# Patient Record
Sex: FEMALE | Race: BLACK OR AFRICAN AMERICAN | URBAN - METROPOLITAN AREA
[De-identification: names, ages, dates, MRNs, and addresses within clinical notes are randomized per-mention and may not be internally consistent; named-entity substitution may affect disease eponyms.]

---

## 2020-08-27 ENCOUNTER — WEB ENCOUNTER (OUTPATIENT)
Dept: URBAN - METROPOLITAN AREA CLINIC 96 | Facility: CLINIC | Age: 40
End: 2020-08-27

## 2020-09-09 ENCOUNTER — OFFICE VISIT (OUTPATIENT)
Dept: URBAN - METROPOLITAN AREA CLINIC 96 | Facility: CLINIC | Age: 40
End: 2020-09-09

## 2022-03-14 ENCOUNTER — OFFICE VISIT (OUTPATIENT)
Dept: URBAN - METROPOLITAN AREA CLINIC 98 | Facility: CLINIC | Age: 42
End: 2022-03-14
Payer: COMMERCIAL

## 2022-03-14 VITALS
HEART RATE: 73 BPM | DIASTOLIC BLOOD PRESSURE: 66 MMHG | WEIGHT: 156.4 LBS | TEMPERATURE: 97.2 F | HEIGHT: 64 IN | SYSTOLIC BLOOD PRESSURE: 110 MMHG | BODY MASS INDEX: 26.7 KG/M2

## 2022-03-14 DIAGNOSIS — K92.1 HEMATOCHEZIA: ICD-10-CM

## 2022-03-14 DIAGNOSIS — K64.8 BLEEDING INTERNAL HEMORRHOIDS: ICD-10-CM

## 2022-03-14 PROCEDURE — 99254 IP/OBS CNSLTJ NEW/EST MOD 60: CPT | Performed by: INTERNAL MEDICINE

## 2022-03-14 PROCEDURE — 99204 OFFICE O/P NEW MOD 45 MIN: CPT | Performed by: INTERNAL MEDICINE

## 2022-03-14 RX ORDER — POLYETHYLENE GLYCOL 3350, SODIUM SULFATE, SODIUM CHLORIDE, POTASSIUM CHLORIDE, ASCORBIC ACID, SODIUM ASCORBATE 140-9-5.2G
AS DIRECTED KIT ORAL ONCE
Qty: 140 GM | Refills: 0 | OUTPATIENT
Start: 2022-03-14 | End: 2022-03-15

## 2022-03-14 NOTE — EXAM-FUNCTIONAL ASSESSMENT
Constitutional: well-developed, normal communication ability.   Eyes: Conjunctivae and eyelids appear normal, no scleral icterus. Respiratory: symmetric expansion of chest wall, normal work of breathing   Gastrointestinal:  normoactive bowel sounds, soft, no tenderness, no rebound tenderness, no shifting dullness, no organomegaly. Patient deferred rectal exam today   Musculoskeletal: normal gait and station   Skin: no jaundice   Neurologic: Oriented to person, place, time. Short term memory intact.    Psychiatric: Normal mood and appropriate affect.

## 2022-03-14 NOTE — HPI-TODAY'S VISIT:
Ms. Alejandre is a 40 yo F presenting for blood in stools and is referred by Dr. Davie Major. A copy of this report will be sent to Dr. Major.  About 1 month ago she began to have bloody stools for 4 days. This happened from hemorrhoids a few years ago when she was hemorrhoids. She has been having regular BMs now with no straining. She had some lower abdominal cramping with the bleeding. No diarrhea or fever. She has had no recent travel or change in diet. No blood in her stools since last month.   She does not take blood thinners or NSAIDs.   No known family history of colon cancer or intestinal issues.

## 2022-03-15 ENCOUNTER — WEB ENCOUNTER (OUTPATIENT)
Dept: URBAN - METROPOLITAN AREA CLINIC 98 | Facility: CLINIC | Age: 42
End: 2022-03-15

## 2022-03-15 LAB
HEMATOCRIT: 40.6
HEMOGLOBIN: 13.5
MCH: 30.1
MCHC: 33.3
MCV: 91
NRBC: (no result)
PLATELETS: 267
RBC: 4.48
RDW: 12.3
WBC: 4.1

## 2022-03-16 ENCOUNTER — TELEPHONE ENCOUNTER (OUTPATIENT)
Dept: URBAN - METROPOLITAN AREA CLINIC 98 | Facility: CLINIC | Age: 42
End: 2022-03-16

## 2022-03-25 ENCOUNTER — OFFICE VISIT (OUTPATIENT)
Dept: URBAN - METROPOLITAN AREA SURGERY CENTER 18 | Facility: SURGERY CENTER | Age: 42
End: 2022-03-25

## 2022-03-30 ENCOUNTER — OFFICE VISIT (OUTPATIENT)
Dept: URBAN - METROPOLITAN AREA MEDICAL CENTER 28 | Facility: MEDICAL CENTER | Age: 42
End: 2022-03-30
Payer: COMMERCIAL

## 2022-03-30 DIAGNOSIS — Z80.0 BROTHER AT YOUNG AGE FAMILY HISTORY OF COLON CANCER: ICD-10-CM

## 2022-03-30 PROCEDURE — G0105 COLORECTAL SCRN; HI RISK IND: HCPCS | Performed by: INTERNAL MEDICINE

## 2022-09-29 ENCOUNTER — OFFICE VISIT (OUTPATIENT)
Dept: URBAN - METROPOLITAN AREA CLINIC 98 | Facility: CLINIC | Age: 42
End: 2022-09-29
Payer: COMMERCIAL

## 2022-09-29 VITALS
HEIGHT: 64 IN | WEIGHT: 171 LBS | BODY MASS INDEX: 29.19 KG/M2 | TEMPERATURE: 97.8 F | DIASTOLIC BLOOD PRESSURE: 90 MMHG | SYSTOLIC BLOOD PRESSURE: 159 MMHG | HEART RATE: 100 BPM

## 2022-09-29 DIAGNOSIS — K64.9 BLEEDING HEMORRHOID: ICD-10-CM

## 2022-09-29 DIAGNOSIS — R74.8 ELEVATED LIVER ENZYMES: ICD-10-CM

## 2022-09-29 PROCEDURE — 99214 OFFICE O/P EST MOD 30 MIN: CPT | Performed by: INTERNAL MEDICINE

## 2022-09-29 NOTE — HPI-TODAY'S VISIT:
Ms. Alejandre is a 41 yo F presenting for followup. 15 weeks pregnant.  Was having elevated BP and urine protein has also been high during the pregnancy.    //High liver enzymes: September 2, 2022 labs for pregnancy showed high AST and ALT. No history of any alcohol use. Rare tylenol use. No history IV drug use. No family history of liver disease. No personal history of liver disease.   //Internal hemorrhoids: Had some scant bleeding on toilet paper a week ago. No blood mixed into stools. H improved her symptoms. No bleeding at this time.  I reviewed:  3/30/22 colonoscopy: internal hemorrhoids 3/15/22 CBC: normal 9/2/22:  HBsAg screen - negative HCV ab negative Alk phos 57 (normal) T bili 0.2 (normal) AST 96 and  (high) Plts 254, H/H 14/43.3

## 2022-09-29 NOTE — EXAM-FUNCTIONAL ASSESSMENT
Constitutional: well-developed, normal communication ability.   Eyes: Conjunctivae and eyelids appear normal, no scleral icterus. Respiratory: symmetric expansion of chest wall, normal work of breathing   Gastrointestinal: gravid uterus, normoactive bowel sounds, soft, no tenderness, no rebound tenderness, no shifting dullness, no organomegaly.   Musculoskeletal: normal gait and station   Skin: no jaundice   Neurologic: Oriented to person, place, time. Short term memory intact.    Psychiatric: Normal mood and appropriate affect.

## 2022-10-03 LAB
ACTIN (SMOOTH MUSCLE) ANTIBODY (IGG): <20
ALPHA-1-ANTITRYPSIN QN: 192
ANA SCREEN, IFA: NEGATIVE
CERULOPLASMIN: 48
CHOL/HDLC RATIO: 2.7
CHOLESTEROL, TOTAL: 183
FERRITIN, SERUM: 25
HDL CHOLESTEROL: 69
HEP B CORE AB, TOT: (no result)
HEPATITIS A AB, TOTAL: REACTIVE
HEPATITIS B SURFACE ANTIBODY QL: REACTIVE
IRON BIND.CAP.(TIBC): 409
IRON SATURATION: 17
IRON: 70
LDL CHOLESTEROL CALC: 87
MITOCHONDRIAL (M2) ANTIBODY: <=20
NON HDL CHOLESTEROL: 114
TRIGLYCERIDES: 168

## 2022-10-18 ENCOUNTER — OFFICE VISIT (OUTPATIENT)
Dept: URBAN - METROPOLITAN AREA CLINIC 97 | Facility: CLINIC | Age: 42
End: 2022-10-18

## 2022-11-01 ENCOUNTER — DASHBOARD ENCOUNTERS (OUTPATIENT)
Age: 42
End: 2022-11-01

## 2022-11-09 ENCOUNTER — OFFICE VISIT (OUTPATIENT)
Dept: URBAN - METROPOLITAN AREA CLINIC 98 | Facility: CLINIC | Age: 42
End: 2022-11-09
Payer: COMMERCIAL

## 2022-11-09 VITALS
DIASTOLIC BLOOD PRESSURE: 84 MMHG | HEIGHT: 64 IN | HEART RATE: 92 BPM | BODY MASS INDEX: 29.53 KG/M2 | SYSTOLIC BLOOD PRESSURE: 140 MMHG | TEMPERATURE: 97.2 F | WEIGHT: 173 LBS

## 2022-11-09 DIAGNOSIS — R11.0 NAUSEA ALONE: ICD-10-CM

## 2022-11-09 DIAGNOSIS — K64.9 BLEEDING HEMORRHOID: ICD-10-CM

## 2022-11-09 DIAGNOSIS — R74.8 ELEVATED LIVER ENZYMES: ICD-10-CM

## 2022-11-09 PROCEDURE — 99213 OFFICE O/P EST LOW 20 MIN: CPT | Performed by: INTERNAL MEDICINE

## 2022-11-09 NOTE — HPI-TODAY'S VISIT:
//Hemorrhoids: stable, no bleeding. No pain. Has not tried medication  //Liver enzymes: stabilized  //Nausea: still present, taking Bonjesta which helps. Down to every other day.  I reviewed:  11/2/22 AST 23, ALT 22 11/2/22 CBC: normal
